# Patient Record
Sex: FEMALE | URBAN - METROPOLITAN AREA
[De-identification: names, ages, dates, MRNs, and addresses within clinical notes are randomized per-mention and may not be internally consistent; named-entity substitution may affect disease eponyms.]

---

## 2022-12-01 ENCOUNTER — APPOINTMENT (RX ONLY)
Dept: URBAN - METROPOLITAN AREA CLINIC 236 | Facility: CLINIC | Age: 72
Setting detail: DERMATOLOGY
End: 2022-12-01

## 2022-12-01 DIAGNOSIS — L73.8 OTHER SPECIFIED FOLLICULAR DISORDERS: ICD-10-CM | Status: INADEQUATELY CONTROLLED

## 2022-12-01 PROCEDURE — ? COUNSELING

## 2022-12-01 PROCEDURE — ? ELECTRODESICCATION

## 2022-12-01 PROCEDURE — 17110 DESTRUCTION B9 LES UP TO 14: CPT

## 2022-12-01 ASSESSMENT — LOCATION DETAILED DESCRIPTION DERM
LOCATION DETAILED: RIGHT SUPERIOR NASAL CHEEK
LOCATION DETAILED: RIGHT SUPERIOR MEDIAL MALAR CHEEK
LOCATION DETAILED: RIGHT MEDIAL MALAR CHEEK

## 2022-12-01 ASSESSMENT — LOCATION ZONE DERM: LOCATION ZONE: FACE

## 2022-12-01 ASSESSMENT — LOCATION SIMPLE DESCRIPTION DERM: LOCATION SIMPLE: RIGHT CHEEK

## 2022-12-01 NOTE — PROCEDURE: ELECTRODESICCATION
Anesthesia Type: 1% lidocaine with epinephrine
Medical Necessity Information: It is in your best interest to select a reason for this procedure from the list below. All of these items fulfill various CMS LCD requirements except the new and changing color options.
Consent: The patient's consent was obtained including but not limited to risks of crusting, scabbing, blistering, scarring, darker or lighter pigmentary change, recurrence, incomplete removal and infection.
Detail Level: Simple
Post-Care Instructions: I reviewed with the patient in detail post-care instructions. Patient is to wear sunprotection, and avoid picking at any of the treated lesions. Pt may apply Vaseline to crusted or scabbing areas
Medical Necessity Clause: This procedure was medically necessary because the lesions that were treated were:
Include Z78.9 (Other Specified Conditions Influencing Health Status) As An Associated Diagnosis?: No

## 2023-06-07 ENCOUNTER — APPOINTMENT (RX ONLY)
Dept: URBAN - METROPOLITAN AREA CLINIC 236 | Facility: CLINIC | Age: 73
Setting detail: DERMATOLOGY
End: 2023-06-07

## 2023-06-07 DIAGNOSIS — Z41.9 ENCOUNTER FOR PROCEDURE FOR PURPOSES OTHER THAN REMEDYING HEALTH STATE, UNSPECIFIED: ICD-10-CM

## 2023-06-07 PROCEDURE — ? BOTOX

## 2023-06-07 NOTE — PROCEDURE: BOTOX
Price (Use Numbers Only, No Special Characters Or $): 914 Price (Use Numbers Only, No Special Characters Or $): 719

## 2023-09-06 ENCOUNTER — APPOINTMENT (RX ONLY)
Dept: URBAN - METROPOLITAN AREA CLINIC 236 | Facility: CLINIC | Age: 73
Setting detail: DERMATOLOGY
End: 2023-09-06

## 2023-09-06 DIAGNOSIS — Z41.9 ENCOUNTER FOR PROCEDURE FOR PURPOSES OTHER THAN REMEDYING HEALTH STATE, UNSPECIFIED: ICD-10-CM

## 2023-09-06 PROCEDURE — ? RESTYLANE INJECTION

## 2023-09-06 PROCEDURE — ? DYSPORT

## 2023-09-06 NOTE — PROCEDURE: DYSPORT
Price (Use Numbers Only, No Special Characters Or $): 899 Price (Use Numbers Only, No Special Characters Or $): 502

## 2023-09-06 NOTE — PROCEDURE: RESTYLANE INJECTION
Price (Use Numbers Only, No Special Characters Or $): 502 Price (Use Numbers Only, No Special Characters Or $): 879

## 2024-01-03 ENCOUNTER — APPOINTMENT (RX ONLY)
Dept: URBAN - METROPOLITAN AREA CLINIC 236 | Facility: CLINIC | Age: 74
Setting detail: DERMATOLOGY
End: 2024-01-03

## 2024-01-03 DIAGNOSIS — Z41.9 ENCOUNTER FOR PROCEDURE FOR PURPOSES OTHER THAN REMEDYING HEALTH STATE, UNSPECIFIED: ICD-10-CM

## 2024-01-03 PROCEDURE — ? DYSPORT

## 2024-01-03 PROCEDURE — ? RESTYLANE DEFYNE INJECTION

## 2024-01-03 NOTE — PROCEDURE: DYSPORT
Anterior Platysmal Bands Units: 0
Show Nasal Units: Yes
Price (Use Numbers Only, No Special Characters Or $): 256
Show Ucl Units: No
Additional Area 1 Location: [facility]
Consent: Written consent obtained. Risks include but not limited to lid/brow ptosis, bruising, swelling, diplopia, temporary effect, incomplete chemical denervation.
Post-Care Instructions: Patient instructed to not lie down for 4 hours and limit physical activity for 24 hours.
Detail Level: Detailed
Depressor Anguli Oris Units: 12
Additional Area 2 Location: obicularis oris
Lot #: [id number]
Additional Area 3 Location: nasalis
Periorbital Skin Units: 72
Dilution (U/0.1 Cc): 2.5
Additional Area 4 Location: masseters
Expiration Date (Month Year): 7/31/2025

## 2024-01-03 NOTE — PROCEDURE: RESTYLANE DEFYNE INJECTION
Topical Anesthesia?: LMX
Lot #: [id number]
Marionette Lines Filler Volume In Cc: 1
Temple Hollows Filler Volume In Cc: 0
Procedural Text: The filler was administered to the treatment areas noted above.
Detail Level: Detailed
Consent: Written consent obtained. Risks include but not limited to bruising, beading, irregular texture, ulceration, infection, allergic reaction, scar formation, incomplete augmentation, temporary nature, procedural pain.
Map Statement: See Attach Map for Complete Details
Include Cannula Size?: 25G
Additional Area 2 Location: [facility]
Include Cannula Information In Note?: No
Post-Care Instructions: Patient instructed to apply ice to reduce swelling. Pt instructed to avoid any dental procedures including cleanings for at least 2 weeks following injections.
Filler: Restylane Defyne
Expiration Date (Month Year): 2024-10-31
Price (Use Numbers Only, No Special Characters Or $): 480
Include Cannula Length?: 1.5 inch
Additional Area 3 Location: mental shadow
Additional Area 1 Location: chin

## 2024-04-03 ENCOUNTER — APPOINTMENT (RX ONLY)
Dept: URBAN - METROPOLITAN AREA CLINIC 236 | Facility: CLINIC | Age: 74
Setting detail: DERMATOLOGY
End: 2024-04-03

## 2024-04-03 DIAGNOSIS — Z41.9 ENCOUNTER FOR PROCEDURE FOR PURPOSES OTHER THAN REMEDYING HEALTH STATE, UNSPECIFIED: ICD-10-CM

## 2024-04-03 PROCEDURE — ? RESTYLANE INJECTION

## 2024-04-03 PROCEDURE — ? DYSPORT

## 2024-04-03 NOTE — PROCEDURE: DYSPORT
Anterior Platysmal Bands Units: 0
Show Topical Anesthesia: Yes
Show Lcl Units: No
Dilution (U/0.1 Cc): 2.5
Additional Area 1 Location: [facility]
Detail Level: Detailed
Additional Area 2 Location: obicularis oris
Price (Use Numbers Only, No Special Characters Or $): 384
Levator Labii Superioris Units: 12
Expiration Date (Month Year): 9/25
Periorbital Skin Units: 72
Lot #: [id number]
Consent: Written consent obtained. Risks include but not limited to lid/brow ptosis, bruising, swelling, diplopia, temporary effect, incomplete chemical denervation.
Additional Area 3 Location: nasalis
Additional Area 4 Location: masseters
Post-Care Instructions: Patient instructed to not lie down for 4 hours and limit physical activity for 24 hours.

## 2024-04-03 NOTE — PROCEDURE: RESTYLANE INJECTION
Expiration Date (Month Year): 2026-01-31
Vermilion Lips Filler Volume In Cc: 0
Additional Area 2 Volume In Cc: 0.4
Procedural Text: The filler was administered to the treatment areas noted above.\\n\\nHyperdilute with .3 1% lidocaine
Map Statement: See Attach Map for Complete Details
Anesthesia Volume In Cc: 0.5
Include Cannula Information In Note?: No
Post-Care Instructions: Patient instructed to apply ice to reduce swelling.
Additional Area 1 Location: chin
Detail Level: Detailed
Consent: Written consent obtained. Risks include but not limited to bruising, beading, irregular texture, ulceration, infection, allergic reaction, scar formation, incomplete augmentation, temporary nature, procedural pain.
Marionette Lines Filler Volume In Cc: 0.6
Topical Anesthesia?: LMX
Include Cannula Size?: 25G
Price (Use Numbers Only, No Special Characters Or $): 745
Additional Area 2 Location: perioral lines
Lot #: [id number]
Filler: Restylane
Include Cannula Length?: 1.5 inch

## 2024-09-04 ENCOUNTER — APPOINTMENT (RX ONLY)
Dept: URBAN - METROPOLITAN AREA CLINIC 236 | Facility: CLINIC | Age: 74
Setting detail: DERMATOLOGY
End: 2024-09-04

## 2024-09-04 DIAGNOSIS — Z41.9 ENCOUNTER FOR PROCEDURE FOR PURPOSES OTHER THAN REMEDYING HEALTH STATE, UNSPECIFIED: ICD-10-CM

## 2024-09-04 PROCEDURE — ? DYSPORT

## 2024-09-04 NOTE — PROCEDURE: DYSPORT
Show Lcl Units: No
Forehead Units: 0
Show Topical Anesthesia: Yes
Additional Area 4 Location: masseters
Post-Care Instructions: Patient instructed to not lie down for 4 hours and limit physical activity for 24 hours.
Consent: Written consent obtained. Risks include but not limited to lid/brow ptosis, bruising, swelling, diplopia, temporary effect, incomplete chemical denervation.
Additional Area 3 Location: nasalis
Additional Comments: July event pre paid promo. will have free area available next visit
Periorbital Skin Units: 72
Price (Use Numbers Only, No Special Characters Or $): 251
Levator Labii Superioris Units: 12
Additional Area 2 Location: obicularis oris
Expiration Date (Month Year): 3/26
Additional Area 1 Units: 18
Detail Level: Detailed
Additional Area 1 Location: [facility]
Dilution (U/0.1 Cc): 2.5
Lot #: [id number]

## 2024-09-18 ENCOUNTER — APPOINTMENT (RX ONLY)
Dept: URBAN - METROPOLITAN AREA CLINIC 236 | Facility: CLINIC | Age: 74
Setting detail: DERMATOLOGY
End: 2024-09-18

## 2024-09-18 DIAGNOSIS — Z41.9 ENCOUNTER FOR PROCEDURE FOR PURPOSES OTHER THAN REMEDYING HEALTH STATE, UNSPECIFIED: ICD-10-CM

## 2024-09-18 PROCEDURE — ? COSMETIC CONSULTATION: FILLERS

## 2024-12-03 ENCOUNTER — APPOINTMENT (OUTPATIENT)
Dept: URBAN - METROPOLITAN AREA CLINIC 236 | Facility: CLINIC | Age: 74
Setting detail: DERMATOLOGY
End: 2024-12-03

## 2024-12-03 DIAGNOSIS — Z02.9 ENCOUNTER FOR ADMINISTRATIVE EXAMINATIONS, UNSPECIFIED: ICD-10-CM

## 2024-12-03 DIAGNOSIS — Z41.9 ENCOUNTER FOR PROCEDURE FOR PURPOSES OTHER THAN REMEDYING HEALTH STATE, UNSPECIFIED: ICD-10-CM

## 2024-12-03 PROCEDURE — ? ADDITIONAL NOTES

## 2024-12-03 PROCEDURE — ? DYSPORT

## 2024-12-03 PROCEDURE — ? RESTYLANE-L INJECTION

## 2024-12-03 PROCEDURE — ? RESTYLANE LYFT INJECTION

## 2024-12-03 NOTE — PROCEDURE: DYSPORT
Show Topical Anesthesia: Yes
Additional Area 6 Units: 0
Depressor Anguli Oris Units: 12
Additional Area 2 Location: obicularis oris
Detail Level: Detailed
Lot #: [id number]
Dilution (U/0.1 Cc): 2.5
Additional Area 3 Location: nasalis
Show Ucl Units: No
Periorbital Skin Units: 72
Additional Area 4 Location: masseters
Expiration Date (Month Year): 5/26
Price (Use Numbers Only, No Special Characters Or $): 216
Additional Comments: pre-purchased gift card used the remaining 60 units free and 6 units ($24) - (7/24 promo)
Additional Area 5 Location: platysmal bands
Additional Area 1 Location: [facility]
Consent: Written consent obtained. Risks include but not limited to lid/brow ptosis, bruising, swelling, diplopia, temporary effect, incomplete chemical denervation.
Post-Care Instructions: Patient instructed to not lie down for 4 hours and limit physical activity for 24 hours.
Additional Area 1 Units: 18

## 2024-12-03 NOTE — PROCEDURE: RESTYLANE LYFT INJECTION
Nasolabial Folds Filler Volume In Cc: 0
Price (Use Numbers Only, No Special Characters Or $): 877.92
Additional Area 3 Location: chin shadow
Use Map Statement For Sites (Optional): Yes
Additional Area 1 Location: chin
Include Cannula Length?: 1.5 inch
Number Of Syringes (Required For Inventory): 1
Lot #: [id number]
Include Cannula Brand?: DermaSculpt
Detail Level: Detailed
Topical Anesthesia?: 5% lidocaine
Additional Area 2 Location: [facility]
Map Statement: See Attach Map for Complete Details
Procedural Text: The filler was administered to the treatment areas noted above.
Consent: Written consent obtained. Risks include but not limited to bruising, beading, irregular texture, ulceration, infection, allergic reaction, scar formation, incomplete augmentation, temporary nature, procedural pain.
Include Cannula Size?: 25G
Expiration Date (Month Year): 12/26
Filler: Alfredo Granados
Include Cannula Information In Note?: No
Post-Care Instructions: Patient instructed to apply ice to reduce swelling.

## 2024-12-03 NOTE — PROCEDURE: RESTYLANE-L INJECTION
Number Of Syringes (Required For Inventory): 2
Detail Level: Detailed
Additional Area 1 Location: chin
Additional Area 5 Volume In Cc: 0
Include Cannula Size?: 25G
Marionette Lines Filler Volume In Cc: 1
Include Cannula Information In Note?: No
Map Statement: See Attach Map for Complete Details
Post-Care Instructions: Patient instructed to apply ice to reduce swelling.
Lot #: [id number]
Price (Use Numbers Only, No Special Characters Or $): 6701.70
Filler: Restylane -L
Include Cannula Length?: 1.5 inch
Additional Area 2 Location: oral comms
Expiration Date (Month Year): 2026-11-30
Procedural Text: The filler was administered to the treatment areas noted above.
Consent: Written consent obtained. Risks include but not limited to bruising, beading, irregular texture, ulceration, infection, allergic reaction, scar formation, incomplete augmentation, temporary nature, procedural pain.

## 2024-12-03 NOTE — PROCEDURE: ADDITIONAL NOTES
Detail Level: Simple
Additional Notes: PT used gift card for $1625 for 3 syringes (7/24) promo
Render Risk Assessment In Note?: no

## 2024-12-17 ENCOUNTER — APPOINTMENT (OUTPATIENT)
Dept: URBAN - METROPOLITAN AREA CLINIC 236 | Facility: CLINIC | Age: 74
Setting detail: DERMATOLOGY
End: 2024-12-17

## 2024-12-17 DIAGNOSIS — Z41.9 ENCOUNTER FOR PROCEDURE FOR PURPOSES OTHER THAN REMEDYING HEALTH STATE, UNSPECIFIED: ICD-10-CM

## 2024-12-17 PROCEDURE — ? COSMETIC FOLLOW-UP

## 2024-12-17 NOTE — PROCEDURE: COSMETIC FOLLOW-UP
Price (Use Numbers Only, No Special Characters Or $): 0
Comments (Free Text): Expressed filler bump from Restylane L lower lip, removed w 18g needle
Detail Level: Zone

## 2024-12-23 ENCOUNTER — APPOINTMENT (OUTPATIENT)
Dept: URBAN - METROPOLITAN AREA CLINIC 236 | Facility: CLINIC | Age: 74
Setting detail: DERMATOLOGY
End: 2024-12-23

## 2024-12-23 DIAGNOSIS — B00.1 HERPESVIRAL VESICULAR DERMATITIS: ICD-10-CM | Status: INADEQUATELY CONTROLLED

## 2024-12-23 PROCEDURE — ? PRESCRIPTION MEDICATION MANAGEMENT

## 2024-12-23 PROCEDURE — ? PRESCRIPTION

## 2024-12-23 PROCEDURE — 99213 OFFICE O/P EST LOW 20 MIN: CPT

## 2024-12-23 PROCEDURE — ? COUNSELING

## 2024-12-23 RX ORDER — VALACYCLOVIR HYDROCHLORIDE 1 G/1
TABLET, FILM COATED ORAL Q12 HOURS
Qty: 4 | Refills: 0 | Status: ERX | COMMUNITY
Start: 2024-12-23

## 2024-12-23 RX ADMIN — VALACYCLOVIR HYDROCHLORIDE: 1 TABLET, FILM COATED ORAL at 00:00

## 2024-12-23 ASSESSMENT — LOCATION DETAILED DESCRIPTION DERM: LOCATION DETAILED: LEFT INFERIOR VERMILION LIP

## 2024-12-23 ASSESSMENT — LOCATION SIMPLE DESCRIPTION DERM: LOCATION SIMPLE: LEFT LIP

## 2024-12-23 ASSESSMENT — LOCATION ZONE DERM: LOCATION ZONE: LIP

## 2025-04-16 ENCOUNTER — APPOINTMENT (OUTPATIENT)
Dept: URBAN - METROPOLITAN AREA CLINIC 236 | Facility: CLINIC | Age: 75
Setting detail: DERMATOLOGY
End: 2025-04-16

## 2025-04-16 DIAGNOSIS — Z41.9 ENCOUNTER FOR PROCEDURE FOR PURPOSES OTHER THAN REMEDYING HEALTH STATE, UNSPECIFIED: ICD-10-CM

## 2025-04-16 PROCEDURE — ? ADDITIONAL NOTES

## 2025-04-16 NOTE — HPI: COSMETIC FOLLOW UP
What Condition Are We Treating?: Bump right lower lip
What Procedure Did We Perform At The Last Visit?: Filler

## 2025-04-16 NOTE — PROCEDURE: ADDITIONAL NOTES
Render Risk Assessment In Note?: no
Detail Level: Simple
Additional Notes: Expressed filler right lower lip with 18g needle.

## 2025-04-23 ENCOUNTER — APPOINTMENT (OUTPATIENT)
Dept: URBAN - METROPOLITAN AREA CLINIC 236 | Facility: CLINIC | Age: 75
Setting detail: DERMATOLOGY
End: 2025-04-23

## 2025-04-23 DIAGNOSIS — Z41.9 ENCOUNTER FOR PROCEDURE FOR PURPOSES OTHER THAN REMEDYING HEALTH STATE, UNSPECIFIED: ICD-10-CM

## 2025-04-23 PROCEDURE — ? DYSPORT

## 2025-04-23 PROCEDURE — ? RESTYLANE INJECTION

## 2025-04-23 NOTE — PROCEDURE: DYSPORT
Additional Area 3 Units: 0
Show Right And Left Pupillary Line Units: No
Dilution (U/0.1 Cc): 2.5
Show Orbicularis Oculi Units: Yes
Expiration Date (Month Year): 7/26
Additional Area 4 Location: masseters
Price (Use Numbers Only, No Special Characters Or $): 380
Additional Area 5 Location: platysmal bands
Additional Area 1 Location: [facility]
Consent: Written consent obtained. Risks include but not limited to lid/brow ptosis, bruising, swelling, diplopia, temporary effect, incomplete chemical denervation.
Additional Area 2 Location: obicularis oris
Post-Care Instructions: Patient instructed to not lie down for 4 hours and limit physical activity for 24 hours.
Levator Labii Superioris Units: 12
Lot #: [id number]
Detail Level: Detailed
Periorbital Skin Units: 72
Additional Area 3 Location: nasalis

## 2025-04-23 NOTE — PROCEDURE: RESTYLANE INJECTION
Additional Area 4 Volume In Cc: 0
Filler: Restylane Refyne
Additional Area 2 Location: perioral lines
Post-Care Instructions: Patient instructed to apply ice to reduce swelling.
Map Statement: See Attach Map for Complete Details
Expiration Date (Month Year): 2/26
Include Cannula Length?: 1.5 inch
Price (Use Numbers Only, No Special Characters Or $): 860.56
Procedural Text: The filler was administered to the treatment areas noted above.
Additional Area 1 Location: chin
Use Map Statement For Sites (Optional): No
Vermilion Lips Filler Volume In Cc: 1
Lot #: [id number]
Anesthesia Volume In Cc: 0.5
Detail Level: Detailed
Consent: Written consent obtained. Risks include but not limited to bruising, beading, irregular texture, ulceration, infection, allergic reaction, scar formation, incomplete augmentation, temporary nature, procedural pain.
Include Cannula Size?: 25G
Include Cannula Information In Note?: Yes
Lot #: [id number]
Filler: Restylane Defyne